# Patient Record
Sex: FEMALE | Race: BLACK OR AFRICAN AMERICAN | NOT HISPANIC OR LATINO | URBAN - METROPOLITAN AREA
[De-identification: names, ages, dates, MRNs, and addresses within clinical notes are randomized per-mention and may not be internally consistent; named-entity substitution may affect disease eponyms.]

---

## 2017-08-22 ENCOUNTER — APPOINTMENT (OUTPATIENT)
Dept: LAB | Facility: CLINIC | Age: 22
End: 2017-08-22
Payer: COMMERCIAL

## 2017-08-22 ENCOUNTER — GENERIC CONVERSION - ENCOUNTER (OUTPATIENT)
Dept: OTHER | Facility: OTHER | Age: 22
End: 2017-08-22

## 2017-08-22 ENCOUNTER — ALLSCRIPTS OFFICE VISIT (OUTPATIENT)
Dept: OTHER | Facility: OTHER | Age: 22
End: 2017-08-22

## 2017-08-22 DIAGNOSIS — Z13.6 ENCOUNTER FOR SCREENING FOR CARDIOVASCULAR DISORDERS: ICD-10-CM

## 2017-08-22 DIAGNOSIS — E01.0 IODINE-DEFICIENCY RELATED DIFFUSE GOITER: ICD-10-CM

## 2017-08-22 DIAGNOSIS — N39.0 URINARY TRACT INFECTION: ICD-10-CM

## 2017-08-22 DIAGNOSIS — J30.9 ALLERGIC RHINITIS: ICD-10-CM

## 2017-08-22 LAB
ALBUMIN SERPL BCP-MCNC: 4.1 G/DL (ref 3.5–5)
ALP SERPL-CCNC: 49 U/L (ref 46–116)
ALT SERPL W P-5'-P-CCNC: 13 U/L (ref 12–78)
ANION GAP SERPL CALCULATED.3IONS-SCNC: 4 MMOL/L (ref 4–13)
AST SERPL W P-5'-P-CCNC: 15 U/L (ref 5–45)
BASOPHILS # BLD AUTO: 0.02 THOUSANDS/ΜL (ref 0–0.1)
BASOPHILS NFR BLD AUTO: 0 % (ref 0–1)
BILIRUB SERPL-MCNC: 0.38 MG/DL (ref 0.2–1)
BUN SERPL-MCNC: 10 MG/DL (ref 5–25)
CALCIUM SERPL-MCNC: 9.3 MG/DL (ref 8.3–10.1)
CHLORIDE SERPL-SCNC: 105 MMOL/L (ref 100–108)
CHOLEST SERPL-MCNC: 118 MG/DL (ref 50–200)
CO2 SERPL-SCNC: 28 MMOL/L (ref 21–32)
CREAT SERPL-MCNC: 0.93 MG/DL (ref 0.6–1.3)
EOSINOPHIL # BLD AUTO: 0.05 THOUSAND/ΜL (ref 0–0.61)
EOSINOPHIL NFR BLD AUTO: 1 % (ref 0–6)
ERYTHROCYTE [DISTWIDTH] IN BLOOD BY AUTOMATED COUNT: 13.8 % (ref 11.6–15.1)
GFR SERPL CREATININE-BSD FRML MDRD: 102 ML/MIN/1.73SQ M
GLUCOSE P FAST SERPL-MCNC: 77 MG/DL (ref 65–99)
HCT VFR BLD AUTO: 39.5 % (ref 34.8–46.1)
HDLC SERPL-MCNC: 95 MG/DL (ref 40–60)
HGB BLD-MCNC: 13.2 G/DL (ref 11.5–15.4)
LDLC SERPL CALC-MCNC: 11 MG/DL (ref 0–100)
LYMPHOCYTES # BLD AUTO: 1.53 THOUSANDS/ΜL (ref 0.6–4.47)
LYMPHOCYTES NFR BLD AUTO: 21 % (ref 14–44)
MCH RBC QN AUTO: 28.8 PG (ref 26.8–34.3)
MCHC RBC AUTO-ENTMCNC: 33.4 G/DL (ref 31.4–37.4)
MCV RBC AUTO: 86 FL (ref 82–98)
MONOCYTES # BLD AUTO: 0.62 THOUSAND/ΜL (ref 0.17–1.22)
MONOCYTES NFR BLD AUTO: 9 % (ref 4–12)
NEUTROPHILS # BLD AUTO: 4.92 THOUSANDS/ΜL (ref 1.85–7.62)
NEUTS SEG NFR BLD AUTO: 69 % (ref 43–75)
NRBC BLD AUTO-RTO: 0 /100 WBCS
PLATELET # BLD AUTO: 203 THOUSANDS/UL (ref 149–390)
PMV BLD AUTO: 10 FL (ref 8.9–12.7)
POTASSIUM SERPL-SCNC: 3.9 MMOL/L (ref 3.5–5.3)
PROT SERPL-MCNC: 8 G/DL (ref 6.4–8.2)
RBC # BLD AUTO: 4.58 MILLION/UL (ref 3.81–5.12)
SODIUM SERPL-SCNC: 137 MMOL/L (ref 136–145)
T4 FREE SERPL-MCNC: 1.05 NG/DL (ref 0.76–1.46)
TRIGL SERPL-MCNC: 61 MG/DL
TSH SERPL DL<=0.05 MIU/L-ACNC: 0.38 UIU/ML (ref 0.36–3.74)
WBC # BLD AUTO: 7.15 THOUSAND/UL (ref 4.31–10.16)

## 2017-08-22 PROCEDURE — 84439 ASSAY OF FREE THYROXINE: CPT

## 2017-08-22 PROCEDURE — 84443 ASSAY THYROID STIM HORMONE: CPT

## 2017-08-22 PROCEDURE — 80061 LIPID PANEL: CPT

## 2017-08-22 PROCEDURE — 87086 URINE CULTURE/COLONY COUNT: CPT

## 2017-08-22 PROCEDURE — 80053 COMPREHEN METABOLIC PANEL: CPT

## 2017-08-22 PROCEDURE — 85025 COMPLETE CBC W/AUTO DIFF WBC: CPT

## 2017-08-23 ENCOUNTER — GENERIC CONVERSION - ENCOUNTER (OUTPATIENT)
Dept: OTHER | Facility: OTHER | Age: 22
End: 2017-08-23

## 2017-08-23 ENCOUNTER — HOSPITAL ENCOUNTER (OUTPATIENT)
Dept: ULTRASOUND IMAGING | Facility: CLINIC | Age: 22
Discharge: HOME | End: 2017-08-23
Payer: COMMERCIAL

## 2017-08-23 DIAGNOSIS — E01.0 IODINE-DEFICIENCY RELATED DIFFUSE GOITER: ICD-10-CM

## 2017-08-23 LAB — BACTERIA UR CULT: NORMAL

## 2017-08-23 PROCEDURE — 76536 US EXAM OF HEAD AND NECK: CPT

## 2017-08-25 ENCOUNTER — GENERIC CONVERSION - ENCOUNTER (OUTPATIENT)
Dept: OTHER | Facility: OTHER | Age: 22
End: 2017-08-25

## 2017-08-29 ENCOUNTER — ALLSCRIPTS OFFICE VISIT (OUTPATIENT)
Dept: OTHER | Facility: OTHER | Age: 22
End: 2017-08-29

## 2017-10-03 ENCOUNTER — ALLSCRIPTS OFFICE VISIT (OUTPATIENT)
Dept: OTHER | Facility: OTHER | Age: 22
End: 2017-10-03

## 2017-10-04 NOTE — PROGRESS NOTES
Assessment  1  Hematochezia (578 1) (K92 1)    Plan  Hematochezia    · Ciprofloxacin HCl - 500 MG Oral Tablet; TAKE 1 TABLET EVERY 12 HOURS DAILY    Discussion/Summary    Suspect colitis, possible food poisoning  Will treat with Cipro  She was instructed to call if not improving  At that point, would need to consider a colitis and GI evaluation  The patient was counseled regarding instructions for management,-impressions  Chief Complaint  1  Abdominal Pain  Patient is here today with complaints of bright red bloody stools and abdominal pain  History of Present Illness  HPI: Patient comes in today complaining of a one-day history of frequent bowel movements and blood in her stool  She has some mild stomach discomfort  No urinary tract symptoms  No prior history of this  No change in medications  No history of hemorrhoids and no discomfort in the rectum  Review of Systems    Constitutional: no fever  Gastrointestinal: abdominal pain, but-no constipation  Active Problems  1  Acne (706 1) (L70 9)   2  Allergic rhinitis (477 9) (J30 9)   3  Allergic sinusitis (477 9) (J30 9)   4  Bladder outlet obstruction (596 0) (N32 0)   5  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)   6  Enlarged thyroid (240 9) (E04 9)   7  Need for influenza vaccination (V04 81) (Z23)   8  PPD screening test (V74 1) (Z11 1)   9  Recurrent UTI (599 0) (N39 0)   10  Strabismus (378 9) (H50 9)    Past Medical History  Active Problems And Past Medical History Reviewed: The active problems and past medical history were reviewed and updated today  Social History   · Brushes teeth twice a day   · Dental care, regularly   · Lives with parents ()   · twin sister and older sister   · Never smoker   · No alcohol use   · Part-time employment   ·    · Student   · Full time    Current Meds   1  No Reported Medications  Requested for: 70BFH1594 Recorded    The medication list was reviewed and updated today  Allergies  1  Penicillins    Vitals   Recorded: 18XXW0004 04:20PM   Heart Rate 71   Systolic 96   Diastolic 60   Height 5 ft 7 in   Weight 134 lb 2 oz   BMI Calculated 21 01   BSA Calculated 1 71   O2 Saturation 99   Pain Scale 8     Physical Exam    Constitutional   General appearance: No acute distress, well appearing and well nourished  Abdomen   Abdomen: Non-tender, no masses  The abdomen was flat  The abdomen was soft and nontender          Signatures   Electronically signed by : Nancy Hopkins MD; Oct  3 2017  4:42PM EST                       (Author)

## 2018-01-10 NOTE — RESULT NOTES
Verified Results  (1) CBC/PLT/DIFF 22Aug2017 04:27PM Marta Hackett Order Number: [id number]     Test Name Result Flag Reference   WBC COUNT 7 15 Thousand/uL  4 31-10 16   RBC COUNT 4 58 Million/uL  3 81-5 12   HEMOGLOBIN 13 2 g/dL  11 5-15 4   HEMATOCRIT 39 5 %  34 8-46  1   MCV 86 fL  82-98   MCH 28 8 pg  26 8-34 3   MCHC 33 4 g/dL  31 4-37 4   RDW 13 8 %  11 6-15 1   MPV 10 0 fL  8 9-12 7   PLATELET COUNT 842 Thousands/uL  149-390   nRBC AUTOMATED 0 /100 WBCs     NEUTROPHILS RELATIVE PERCENT 69 %  43-75   LYMPHOCYTES RELATIVE PERCENT 21 %  14-44   MONOCYTES RELATIVE PERCENT 9 %  4-12   EOSINOPHILS RELATIVE PERCENT 1 %  0-6   BASOPHILS RELATIVE PERCENT 0 %  0-1   NEUTROPHILS ABSOLUTE COUNT 4 92 Thousands/? ??L  1 85-7 62   LYMPHOCYTES ABSOLUTE COUNT 1 53 Thousands/? ??L  0 60-4 47   MONOCYTES ABSOLUTE COUNT 0 62 Thousand/? ??L  0 17-1 22   EOSINOPHILS ABSOLUTE COUNT 0 05 Thousand/? ??L  0 00-0 61   BASOPHILS ABSOLUTE COUNT 0 02 Thousands/? ??L  0 00-0 10     (1) T4, FREE 24Oqq5112 04:10PM Anisa Goodrich    Order Number: [id number]     Test Name Result Flag Reference   T4,FREE 1 05 ng/dL  0 76-1 46   Specimen collection should occur prior to Sulfasalazine administration due to the potential for falsely elevated results  (1) TSH 45Bny0542 04:10PM [employer] Order Number: [id number]     Test Name Result Flag Reference   TSH 0 382 uIU/mL  0 358-3 740   Patients undergoing fluorescein dye angiography may retain small amounts of fluorescein in the body for 48-72 hours post procedure  Samples containing fluorescein can produce falsely depressed TSH values  If the patient had this procedure,a specimen should be resubmitted post fluorescein clearance            The recommended reference ranges for TSH during pregnancy are as follows:  First trimester 0 1 to 2 5 uIU/mL  Second trimester  0 2 to 3 0 uIU/mL  Third trimester 0 3 to 3 0 uIU/m     (1) COMPREHENSIVE METABOLIC PANEL [id number] 04:10PM Vania GARAY Order Number: [id number]     Test Name Result Flag Reference   SODIUM 137 mmol/L  136-145   POTASSIUM 3 9 mmol/L  3 5-5 3   CHLORIDE 105 mmol/L  100-108   CARBON DIOXIDE 28 mmol/L  21-32   ANION GAP (CALC) 4 mmol/L  4-13   BLOOD UREA NITROGEN 10 mg/dL  5-25   CREATININE 0 93 mg/dL  0 60-1 30   Standardized to IDMS reference method   CALCIUM 9 3 mg/dL  8 3-10 1   BILI, TOTAL 0 38 mg/dL  0 20-1 00   ALK PHOSPHATAS 49 U/L     ALT (SGPT) 13 U/L  12-78   Specimen collection should occur prior to Sulfasalazine and/or Sulfapyridine administration due to the potential for falsely depressed results  AST(SGOT) 15 U/L  5-45   Specimen collection should occur prior to Sulfasalazine administration due to the potential for falsely depressed results  ALBUMIN 4 1 g/dL  3 5-5 0   TOTAL PROTEIN 8 0 g/dL  6 4-8 2   eGFR 102 ml/min/1 73sq [facility] Disease Education Program recommendations are as follows:  GFR calculation is accurate only with a steady state creatinine  Chronic Kidney disease less than 60 ml/min/1 73 sq  meters  Kidney failure less than 15 ml/min/1 73 sq  meters  GLUCOSE FASTING 77 mg/dL  65-99   Specimen collection should occur prior to Sulfasalazine administration due to the potential for falsely depressed results  Specimen collection should occur prior to Sulfapyridine administration due to the potential for falsely elevated results  (1) LIPID PANEL, FASTING 00Usv3644 04:10PM Adaline Priyank Order Number: [id number]     Test Name Result Flag Reference   CHOLESTEROL 118 mg/dL     HDL,DIRECT 95 mg/dL H 40-60   Specimen collection should occur prior to Metamizole administration due to the potential for falsley depressed results  LDL CHOLESTEROL CALCULATED 11 mg/dL  0-100   Triglyceride:        Normal ??? ??? ??? ??? ??? ??? ??? <150 mg/dl   ??? ??? ???Borderline High ??? ??? 150-199 mg/dl   ??? ??? ? ?? High ??? ??? ??? ??? ??? ??? ??? 200-499 mg/dl   ??? ??? ? ??Very High ??? ??? ??? ??? ??? >499 mg/dl      Cholesterol:       Desirable ??? ??? ??? ??? <200 mg/dl   ??? ??? Borderline High ??? 200-239 mg/dl   ??? ??? High ??? ??? ??? ??? ??? ??? >239 mg/dl      HDL Cholesterol:       High ??? ???>59 mg/dL   ??? ??? Low ??? ??? <41 mg/dL      This screening LDL is a calculated result  It does not have the accuracy of the Direct Measured LDL in the monitoring of patients with hyperlipidemia and/or statin therapy  Direct Measure LDL (FMN841) must be ordered separately in these patients  TRIGLYCERIDES 61 mg/dL  <=150   Specimen collection should occur prior to N-Acetylcysteine or Metamizole administration due to the potential for falsely depressed results

## 2018-01-11 NOTE — MISCELLANEOUS
Provider Comments  Provider Comments:   PATIENT DID NOT SHOW UP AT THE APPOINTMENT WITH DR LEON AT 3:40 P  M  Signatures   Electronically signed by : Pito Ernandez [facility];  Aug 16 2016  5:16PM EST                       (Author)

## 2018-01-12 NOTE — RESULT NOTES
Verified Results  US THYROID [id number] 02:58PM Marianne Wild Order Number: [id number]    - Patient Instructions: To schedule this appointment, please contact Central Scheduling at [phone]  Test Name Result Flag Reference   US THYROID (Report)     THYROID ULTRASOUND     INDICATION: Iron deficiency related diffuse goiter  COMPARISON: None     TECHNIQUE:  Ultrasound of the thyroid was performed with a high frequency linear transducer in transverse and sagittal planes including volumetric imaging sweeps as well as traditional still imaging technique  FINDINGS:   Normal homogeneous smooth echotexture  Right gland: 5 5 x 1 0 x 1 6 cm  No dominant nodules  3 mm mid to lower pole colloid cyst      Left gland: 4 5 x 1 1 x 1 5 cm  No dominant nodules  Isthmus: The isthmus is 0 3 cm in AP dimension         IMPRESSION:      Tiny right-sided colloid cyst  Otherwise, normal              Workstation performed: [id number]     Signed by:   Issac Cao DO   8/25/17

## 2018-01-14 VITALS
OXYGEN SATURATION: 99 % | SYSTOLIC BLOOD PRESSURE: 104 MMHG | HEART RATE: 108 BPM | BODY MASS INDEX: 20.6 KG/M2 | TEMPERATURE: 97.8 F | DIASTOLIC BLOOD PRESSURE: 70 MMHG | HEIGHT: 67 IN | WEIGHT: 131.25 LBS

## 2018-01-14 NOTE — PROGRESS NOTES
Assessment    1  Encounter for preventive health examination (V70 0) (Z00 00)   2  Allergic rhinitis (477 9) (J30 9)   3  Enlarged thyroid (240 9) (E01 0)    Plan  Allergic rhinitis    · (1) CBC/PLT/DIFF; Status:Active; Requested for:04Ruh1626;    · (1) COMPREHENSIVE METABOLIC PANEL; Status:Active; Requested for:22Aug2017;   Enlarged thyroid    · (1) T4, FREE; Status:Active; Requested for:22Aug2017;    · (1) TSH; Status:Active; Requested for:22Aug2017;    · US THYROID; Status:Hold For - Scheduling; Requested for:22Aug2017; Health Maintenance    · Follow-up visit in 1 year Evaluation and Treatment  Follow-up  Status: Hold For -  Scheduling  Requested for: 22Aug2017  Recurrent UTI    · (1) URINE CULTURE; Source:Urine, Clean Catch; Status:Active; Requested  for:22Aug2017;   Screening for heart disease    · (1) LIPID PANEL, FASTING; Status:Active; Requested for:22Aug2017;     Discussion/Summary  health maintenance visit healthy adult female Currently, she eats a healthy diet and has an adequate exercise regimen  the risks and benefits of cervical cancer screening were discussed cervical cancer screening is current Breast cancer screening: the risks and benefits of breast cancer screening were discussed and monthly self breast exam was advised  The risks and benefits of immunizations were discussed and immunizations are up to date  She was advised to be evaluated by a dentist  Patient discussion: discussed with the patient  Patient to start loratadine 10 mg 1 daily  Can also start Rhinocort or Nasacort nasal spray 1 spray each nostril twice daily as instructed and demonstrated  We'll have screening labs done, we'll repeat urine culture, will also send for ultrasound of thyroid as gland is palpably enlarged  Follow up pending results  General health exam in one year     The patient was counseled regarding instructions for management, risk factor reductions, impressions, risks and benefits of treatment options, importance of compliance with treatment  Possible side effects of new medications were reviewed with the patient/guardian today  The treatment plan was reviewed with the patient/guardian  The patient/guardian understands and agrees with the treatment plan     Self Referrals: No      Chief Complaint  pt is in office today for yearly physical       History of Present Illness  HM, Adult Female:   General Health: She does not have regular dental visits  She complains of vision problems  She denies hearing loss  Immunizations status: up to date  Lifestyle:  She consumes a diverse and healthy diet  She does not have any weight concerns  She exercises regularly  She does not use tobacco  She consumes alcohol  She denies drug use  Reproductive health:  she reports normal menses  she uses no contraception  she is not sexually active  Screening: cancer screening reviewed and updated  metabolic screening reviewed and updated  risk screening reviewed and updated  Additional History:  60-year-old female presents for her yearly health maintenance exam  She is generally feeling well with a couple exceptions 1 she had a recent urinary tract infection, and has a history of recurrent urinary tract infections  She just been on antibiotics, finish them 5 days ago, would like to repeat the urine culture to make sure it is cleared  She is also complaining of head congestion, runny nose, postnasal drip, and a cough worse at night when supine  No fever or chills  No earaches  She also has a sensation of a lump in her neck  Review of Systems    Constitutional: no fever, not feeling poorly, no chills and not feeling tired  Eyes: no eyesight problems  ENT: nasal discharge, but no earache and no sore throat  Cardiovascular: No complaints of slow heart rate, no fast heart rate, no chest pain, no palpitations, no leg claudication, no lower extremity edema     Respiratory: No complaints of shortness of breath, no wheezing, no cough, no SOB on exertion, no orthopnea, no PND  Gastrointestinal: no abdominal pain and no blood in stools  Genitourinary: no dysuria  Musculoskeletal: no arthralgias and no myalgias  Integumentary: no rashes  Neurological: no headache, no dizziness, no fainting and no difficulty walking  Psychiatric: not suicidal, no anxiety and no depression  Hematologic/Lymphatic: No complaints of swollen glands, no swollen glands in the neck, does not bleed easily, does not bruise easily  Active Problems    1  Acne (706 1) (L70 9)   2  Allergic rhinitis (477 9) (J30 9)   3  Bladder outlet obstruction (596 0) (N32 0)   4  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)   5  Need for influenza vaccination (V04 81) (Z23)   6  PPD screening test (V74 1) (Z11 1)   7  Recurrent UTI (599 0) (N39 0)   8  Strabismus (378 9) (H50 9)    Past Medical History    · History of Abdominal pain (789 00) (R10 9)   · History of Acute cystitis with hematuria (595 0) (N30 01)   · History of Acute maxillary sinusitis, recurrence not specified (461 0) (J01 00)   · History of influenza (V12 09) (Z87 09)   · Otitis, serous (381 4) (H65 90)   · History of UTI (lower urinary tract infection) (599 0) (N39 0)   · History of Weight loss, unintentional (783 21) (R63 4)   · History of Yeast vaginitis (112 1) (B37 3)    Surgical History    · History of Eye Surgery Results Strabismus Left Eye    Family History  Mother    · Family history of Hypertension  Father    · Family history of Hypertension  Family History    · Denied: Family history of mental disorder   · Denied: Family history of substance abuse    Social History    · Brushes teeth twice a day   · Dental care, regularly   · Lives with parents ()   · twin sister and older sister   · Never smoker   · No alcohol use   · Part-time employment   ·    · Student   · Full time    Current Meds   1  Ciprofloxacin HCl - 250 MG Oral Tablet; Take 1 tablet twice daily;    Therapy: 48TFL1300 to (Evaluate:02Aug2017); Last Rx:28Jul2017 Ordered   2  Clindamycin Phos-Benzoyl Perox 1-5 % External Gel; APPLY SPARINGLY TO THE   AFFECTED AREA(S) TWICE DAILY; Therapy: 01KHR4502 to (Evaluate:22Jan2017)  Requested for: 26Jul2016; Last   Rx:[id number] Ordered   3  Tri-Sprintec 0 18/0 215/0 25 MG-35 MCG Oral Tablet; TAKE 1 TABLET DAILY AS   DIRECTED; Therapy: 06AMO9729 to (Evaluate:27Jul2016); Last Rx:[id number] Ordered    Allergies    1  Penicillins    Vitals   Recorded: 22Aug2017 11:12AM   Heart Rate 95   Systolic 890   Diastolic 62   Height 5 ft 7 in   Weight 132 lb    BMI Calculated 20 67   BSA Calculated 1 69   O2 Saturation 98     Physical Exam    Constitutional   General appearance: No acute distress, well appearing and well nourished  Head and Face   Head and face: Normal     Palpation of the face and sinuses: No sinus tenderness  Eyes   Conjunctiva and lids: Abnormal   Granular injected conjunctiva  Pupils and irises: Equal, round, reactive to light  Allergic shiners present  Ears, Nose, Mouth, and Throat   External inspection of ears and nose: Normal     Otoscopic examination: Tympanic membranes translucent with normal light reflex  Canals patent without erythema  Hearing: Normal     Nasal mucosa, septum, and turbinates: Abnormal   Pale boggy enlarged inferior nasal turbinates  Lips, teeth, and gums: Normal, good dentition  Oropharynx: Abnormal   Increased whitish postnasal discharge with injected granular posterior pharynx  Neck   Neck: Supple, symmetric, trachea midline, no masses  Thyroid: Abnormal   Palpably enlarged, rubbery thyroid, right greater than left, no palpable nodules  Pulmonary   Respiratory effort: No increased work of breathing or signs of respiratory distress  Auscultation of lungs: Clear to auscultation  Cardiovascular   Auscultation of heart: Normal rate and rhythm, normal S1 and S2, no murmurs  Carotid pulses: 2+ bilaterally      Abdominal aorta: Normal     Femoral pulses: 2+ bilaterally  Pedal pulses: 2+ bilaterally  Examination of extremities for edema and/or varicosities: Normal     Abdomen   Abdomen: Non-tender, no masses  Liver and spleen: No hepatomegaly or splenomegaly  Lymphatic   Palpation of lymph nodes in neck: No lymphadenopathy  Palpation of lymph nodes in axillae: No lymphadenopathy  Palpation of lymph nodes in groin: No lymphadenopathy  Palpation of lymph nodes in other areas: No lymphadenopathy  Musculoskeletal   Gait and station: Normal     Digits and nails: Normal without clubbing or cyanosis  Joints, bones, and muscles: Normal     Range of motion: Normal     Stability: Normal     Muscle strength/tone: Normal     Skin   Skin and subcutaneous tissue: Normal without rashes or lesions  Palpation of skin and subcutaneous tissue: Normal turgor  Neurologic   Cranial nerves: Cranial nerves II-XII intact  Reflexes: 2+ and symmetric  Sensation: No sensory loss  Coordination: Normal finger to nose and heel to shin  Psychiatric   Judgment and insight: Normal     Orientation to person, place, and time: Normal     Recent and remote memory: Intact  Mood and affect: Normal        Results/Data  PHQ-2 Adult Depression Screening 76Efc2792 11:42AM User, [facility]     Test Name Result Flag Reference   PHQ-2 Adult Depression Score 0     Over the last two weeks, how often have you been bothered by any of the following problems? Little interest or pleasure in doing things: Not at all - 0  Feeling down, depressed, or hopeless: Not at all - 0   PHQ-2 Adult Depression Screening Negative         Signatures   Electronically signed by : Ashly Castro, [facility];  Aug 22 2017 11:52AM EST                       (Author)    Electronically signed by : Claudia Campbell MD; Aug 22 2017 12:52PM EST

## 2018-01-15 VITALS
OXYGEN SATURATION: 99 % | HEIGHT: 67 IN | SYSTOLIC BLOOD PRESSURE: 96 MMHG | DIASTOLIC BLOOD PRESSURE: 60 MMHG | HEART RATE: 71 BPM | BODY MASS INDEX: 21.05 KG/M2 | WEIGHT: 134.13 LBS

## 2018-01-15 NOTE — PROGRESS NOTES
Assessment    1  Preop exam for internal medicine (V72 83) (Z01 818)   2  Strabismus (378 9) (H50 9)    Plan  PPD screening test    · Tubersol 5 UNIT/0 1ML Intradermal Solution    Discussion/Summary  Surgical Clearance: She is at a LOW risk from a cardiovascular standpoint at this time without any additional cardiac testing  Reevaluation needed, if she should present with symptoms prior to surgery/procedure  General Physical exam is acceptable  CBC is wnl  PATIENT IS MEDICALLY CLEARED FOR PROPOSED PROCEDURE  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  Active Problems    1  Allergic rhinitis (477 9) (J30 9)   2  Bladder outlet obstruction (596 0) (N32 0)   3  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)   4  Recurrent UTI (599 0) (N39 0)    Past Medical History    · History of Abdominal pain (789 00) (R10 9)   · History of Acute cystitis with hematuria (595 0) (N30 01)   · History of Acute maxillary sinusitis, recurrence not specified (461 0) (J01 00)   · Otitis, serous (381 4) (H65 90)   · History of UTI (lower urinary tract infection) (599 0) (N39 0)   · History of Weight loss, unintentional (783 21) (R63 4)   · History of Yeast vaginitis (112 1) (B37 3)    The active problems and past medical history were reviewed and updated today        Surgical History    · Denied: History Of Prior Surgery    The surgical history was reviewed and updated today  Family History    · Family history of Hypertension    · Family history of Hypertension    Social History    · Lives with parents ()   · twin sister and older sister   · Never A Smoker   · No alcohol use   · Part-time employment   ·    · Student   · Full time  The social history was reviewed and updated today  The social history was reviewed and is unchanged  Current Meds   1  Depo-Provera 100 MG/ML SOLN; USE AS DIRECTED; Therapy: (Recorded:38Lmr8283) to Recorded   2  DrRx Bactrim  MG/160 MG #14; ONE TAB DAILY; Therapy: 38IFJ0578 to (Last Rx:83Bof0706) Ordered   3  Sulfamethoxazole-Trimethoprim 800-160 MG Oral Tablet; Take 1 tablet daily; Therapy: 41YWF6637 to (Evaluate:79Fkg6132); Last Rx:13Jan2016 Ordered   4  Sulfamethoxazole-Trimethoprim 800-160 MG Oral Tablet; TAKE 1 TABLET TWICE DAILY   UNTIL FINISHED; Therapy: 83ZPG6175 to (Evaluate:23Jan2016)  Requested for: 14NEF9298; Last   Rx:13Jan2016 Ordered    The medication list was reviewed and updated today  Allergies    1  Penicillins    Vitals   Recorded: 41MUV4782 03:23PM   Heart Rate 80   Systolic 681   Diastolic 80   Height 5 ft 7 in   2-20 Stature Percentile 86 %   Weight 136 lb    2-20 Weight Percentile 63 %   BMI Calculated 21 3   BMI Percentile 45 %   BSA Calculated 1 72     Physical Exam    Constitutional   General appearance: No acute distress, well appearing and well nourished  Head and Face   Head and face: Normal     Eyes   Conjunctiva and lids: No swelling, erythema or discharge  Pupils and irises: Equal, round, reactive to light  Ears, Nose, Mouth, and Throat   External inspection of ears and nose: Normal     Otoscopic examination: Tympanic membranes translucent with normal light reflex  Canals patent without erythema  Hearing: Normal     Nasal mucosa, septum, and turbinates: Normal without edema or erythema  Lips, teeth, and gums: Normal, good dentition  Oropharynx: Normal with no erythema, edema, exudate or lesions  Neck   Neck: Supple, symmetric, trachea midline, no masses  Thyroid: Normal, no thyromegaly  Pulmonary   Respiratory effort: No increased work of breathing or signs of respiratory distress  Auscultation of lungs: Clear to auscultation  Cardiovascular   Auscultation of heart: Normal rate and rhythm, normal S1 and S2, no murmurs  Carotid pulses: 2+ bilaterally  Abdominal aorta: Normal     Femoral pulses: 2+ bilaterally  Pedal pulses: 2+ bilaterally  Examination of extremities for edema and/or varicosities: Normal     Abdomen   Abdomen: Non-tender, no masses  Liver and spleen: No hepatomegaly or splenomegaly  Lymphatic   Palpation of lymph nodes in neck: No lymphadenopathy  Palpation of lymph nodes in axillae: No lymphadenopathy  Palpation of lymph nodes in groin: No lymphadenopathy  Palpation of lymph nodes in other areas: No lymphadenopathy  Musculoskeletal   Gait and station: Normal     Digits and nails: Normal without clubbing or cyanosis  Joints, bones, and muscles: Normal     Range of motion: Normal     Stability: Normal     Muscle strength/tone: Normal     Skin   Skin and subcutaneous tissue: Normal without rashes or lesions  Palpation of skin and subcutaneous tissue: Normal turgor  Neurologic   Cranial nerves: Cranial nerves II-XII intact  Reflexes: 2+ and symmetric  Sensation: No sensory loss  Coordination: Normal finger to nose and heel to shin  Psychiatric   Judgment and insight: Normal     Orientation to person, place, and time: Normal     Recent and remote memory: Intact  Mood and affect: Normal        End of Encounter Meds    1  Sulfamethoxazole-Trimethoprim 800-160 MG Oral Tablet; Take 1 tablet daily; Therapy: 66WLU9074 to (Evaluate:43Esp1860); Last Rx:13Jan2016 Ordered    2   Sulfamethoxazole-Trimethoprim 800-160 MG Oral Tablet; TAKE 1 TABLET TWICE DAILY   UNTIL FINISHED; Therapy: 89DNZ0138 to (Evaluate:23Jan2016)  Requested for: 20UOT6211; Last   Rx:13Jan2016 Ordered    3  DrRx Bactrim  MG/160 MG #14; ONE TAB DAILY; Therapy: 24NRB9809 to (Last Rx:01Dec2015) Ordered    4  Depo-Provera 100 MG/ML SOLN; USE AS DIRECTED;    Therapy: (Recorded:18Dec2015) to Recorded    Future Appointments    Date/Time Provider Specialty Site   01/29/2016 02:00 PM Pal Lynn 38 Internal Medic, Nurse Schedule  [facility] INTERNAL MED     Signatures   Electronically signed by : Lurdes Gonzales, [facility]; Jan 27 2016  4:51PM EST                       (Author)    Electronically signed by : Abbie Carter MD; Jan 27 2016  4:57PM EST

## 2018-01-16 NOTE — MISCELLANEOUS
Dear  Loyad Crook: We missed you for your originally scheduled neurological followup appointment with Ted Khan  Please call at your earliest convenience to reschedule this appointment      Sincerely,   Alonso Liter      Extension: [phone]      Electronically signed by:Elissa Goldberg   Jan 26 2016  5:20PM EST Co-author

## 2018-01-17 NOTE — CONSULTS
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  Active Problems    1  Allergic rhinitis (477 9) (J30 9)   2  Bladder outlet obstruction (596 0) (N32 0)   3  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)   4  Recurrent UTI (599 0) (N39 0)    Past Medical History    · History of Abdominal pain (789 00) (R10 9)   · History of Acute cystitis with hematuria (595 0) (N30 01)   · History of Acute maxillary sinusitis, recurrence not specified (461 0) (J01 00)   · Otitis, serous (381 4) (H65 90)   · History of UTI (lower urinary tract infection) (599 0) (N39 0)   · History of Weight loss, unintentional (783 21) (R63 4)   · History of Yeast vaginitis (112 1) (B37 3)    The active problems and past medical history were reviewed and updated today  Surgical History    · Denied: History Of Prior Surgery    The surgical history was reviewed and updated today  Family History    · Family history of Hypertension    · Family history of Hypertension    Social History    · Lives with parents ()   · twin sister and older sister   · Never A Smoker   · No alcohol use   · Part-time employment   ·    · Student   · Full time  The social history was reviewed and updated today  The social history was reviewed and is unchanged  Current Meds   1  Depo-Provera 100 MG/ML SOLN; USE AS DIRECTED;    Therapy: (Recorded:47Oov0860) to Recorded   2  DrRx Bactrim  MG/160 MG #14; ONE TAB DAILY; Therapy: 62YKK5856 to (Last Rx:20Ajh9251) Ordered   3  Sulfamethoxazole-Trimethoprim 800-160 MG Oral Tablet; Take 1 tablet daily; Therapy: 79YKR5719 to (Evaluate:12Feb2016); Last Rx:13Jan2016 Ordered   4  Sulfamethoxazole-Trimethoprim 800-160 MG Oral Tablet; TAKE 1 TABLET TWICE DAILY   UNTIL FINISHED; Therapy: 37RZJ0415 to (Evaluate:23Jan2016)  Requested for: 60DRD4513; Last   Rx:13Jan2016 Ordered    The medication list was reviewed and updated today  Allergies    1  Penicillins    Vitals  Signs [Data Includes: Current Encounter]    Heart Rate: 80  Systolic: 362  Diastolic: 80  Height: 5 ft 7 in  2-20 Stature Percentile: 86 %  Weight: 136 lb   2-20 Weight Percentile: 63 %  BMI Calculated: 21 3  BMI Percentile: 45 %  BSA Calculated: 1 72    Physical Exam    Constitutional   General appearance: No acute distress, well appearing and well nourished  Head and Face   Head and face: Normal     Eyes   Conjunctiva and lids: No swelling, erythema or discharge  Pupils and irises: Equal, round, reactive to light  Ears, Nose, Mouth, and Throat   External inspection of ears and nose: Normal     Otoscopic examination: Tympanic membranes translucent with normal light reflex  Canals patent without erythema  Hearing: Normal     Nasal mucosa, septum, and turbinates: Normal without edema or erythema  Lips, teeth, and gums: Normal, good dentition  Oropharynx: Normal with no erythema, edema, exudate or lesions  Neck   Neck: Supple, symmetric, trachea midline, no masses  Thyroid: Normal, no thyromegaly  Pulmonary   Respiratory effort: No increased work of breathing or signs of respiratory distress  Auscultation of lungs: Clear to auscultation  Cardiovascular   Auscultation of heart: Normal rate and rhythm, normal S1 and S2, no murmurs  Carotid pulses: 2+ bilaterally  Abdominal aorta: Normal     Femoral pulses: 2+ bilaterally  Pedal pulses: 2+ bilaterally  Examination of extremities for edema and/or varicosities: Normal     Abdomen   Abdomen: Non-tender, no masses  Liver and spleen: No hepatomegaly or splenomegaly  Lymphatic   Palpation of lymph nodes in neck: No lymphadenopathy  Palpation of lymph nodes in axillae: No lymphadenopathy  Palpation of lymph nodes in groin: No lymphadenopathy  Palpation of lymph nodes in other areas: No lymphadenopathy  Musculoskeletal   Gait and station: Normal     Digits and nails: Normal without clubbing or cyanosis  Joints, bones, and muscles: Normal     Range of motion: Normal     Stability: Normal     Muscle strength/tone: Normal     Skin   Skin and subcutaneous tissue: Normal without rashes or lesions  Palpation of skin and subcutaneous tissue: Normal turgor  Neurologic   Cranial nerves: Cranial nerves II-XII intact  Reflexes: 2+ and symmetric  Sensation: No sensory loss  Coordination: Normal finger to nose and heel to shin  Psychiatric   Judgment and insight: Normal     Orientation to person, place, and time: Normal     Recent and remote memory: Intact  Mood and affect: Normal        Assessment    1  Preop exam for internal medicine (V72 83) (Z01 818)   2  Strabismus (378 9) (H50 9)    Plan  PPD screening test    · Tubersol 5 UNIT/0 1ML Intradermal Solution    Discussion/Summary  Surgical Clearance: She is at a LOW risk from a cardiovascular standpoint at this time without any additional cardiac testing  Reevaluation needed, if she should present with symptoms prior to surgery/procedure  General Physical exam is acceptable  CBC is wnl  PATIENT IS MEDICALLY CLEARED FOR PROPOSED PROCEDURE  End of Encounter Meds    1  Sulfamethoxazole-Trimethoprim 800-160 MG Oral Tablet; Take 1 tablet daily; Therapy: 71BPE5788 to (Evaluate:77Mtq3138); Last Rx:13Jan2016 Ordered    2   Sulfamethoxazole-Trimethoprim 800-160 MG Oral Tablet; TAKE 1 TABLET TWICE DAILY UNTIL FINISHED; Therapy: 61NTE9307 to (Evaluate:23Jan2016)  Requested for: 15VDY1507; Last   Rx:13Jan2016 Ordered    3  DrRx Bactrim  MG/160 MG #14; ONE TAB DAILY; Therapy: 47BPN7367 to (Last Rx:45Sov8345) Ordered    4  Depo-Provera 100 MG/ML SOLN; USE AS DIRECTED;    Therapy: (Recorded:27Wla9737) to Recorded    Signatures   Electronically signed by : Kelsy Salinas, [facility]; Jan 27 2016  4:51PM EST                       (Author)    Electronically signed by : Tona Sexton MD; Jan 27 2016  4:57PM EST

## 2018-01-22 VITALS
BODY MASS INDEX: 20.72 KG/M2 | HEART RATE: 95 BPM | SYSTOLIC BLOOD PRESSURE: 118 MMHG | DIASTOLIC BLOOD PRESSURE: 62 MMHG | WEIGHT: 132 LBS | OXYGEN SATURATION: 98 % | HEIGHT: 67 IN

## 2020-06-16 NOTE — RESULT NOTES
Verified Results  (1) URINE CULTURE 12Pnc2125 02:03PM Malcom Millard Order Number: [id number]     Test Name Result Flag Reference   CLINICAL REPORT (Report)     Test:        Urine culture  Specimen Type:   Urine  Result Date:    8/23/2017 2:03 PM  Result Status:   Final result  Resulting Lab:   [facility] [address] [place] [id number]            Tel: [phone]      CULTURE                                       ------------------                                   No Growth <1000 cfu/mL Who is helen? She is not on [facility].